# Patient Record
Sex: MALE | URBAN - METROPOLITAN AREA
[De-identification: names, ages, dates, MRNs, and addresses within clinical notes are randomized per-mention and may not be internally consistent; named-entity substitution may affect disease eponyms.]

---

## 2017-09-10 ENCOUNTER — HISTORICAL (OUTPATIENT)
Dept: URGENT CARE | Facility: CLINIC | Age: 22
End: 2017-09-10

## 2017-09-10 LAB — RAPID GROUP A STREP (OHS): POSITIVE

## 2017-09-12 LAB — FINAL CULTURE: NORMAL

## 2018-01-31 LAB
INFLUENZA A ANTIGEN, POC: NEGATIVE
INFLUENZA B ANTIGEN, POC: NEGATIVE
RAPID GROUP A STREP (OHS): POSITIVE

## 2018-02-12 LAB — RAPID GROUP A STREP (OHS): POSITIVE

## 2018-03-12 ENCOUNTER — HISTORICAL (OUTPATIENT)
Dept: URGENT CARE | Facility: CLINIC | Age: 23
End: 2018-03-12

## 2018-03-12 LAB
BILIRUB SERPL-MCNC: NEGATIVE MG/DL
BLOOD URINE, POC: NEGATIVE
CLARITY, POC UA: NORMAL
COLOR, POC UA: NORMAL
GLUCOSE UR QL STRIP: NEGATIVE
KETONES UR QL STRIP: NEGATIVE
LEUKOCYTE EST, POC UA: NEGATIVE
NITRITE, POC UA: NEGATIVE
PH, POC UA: 7
PROTEIN, POC: NEGATIVE
SPECIFIC GRAVITY, POC UA: 1
UROBILINOGEN, POC UA: NORMAL

## 2018-03-14 LAB — FINAL CULTURE: NO GROWTH

## 2019-08-31 LAB
BILIRUB SERPL-MCNC: NEGATIVE MG/DL
BLOOD URINE, POC: NEGATIVE
CLARITY, POC UA: CLEAR
COLOR, POC UA: NORMAL
GLUCOSE UR QL STRIP: NEGATIVE
KETONES UR QL STRIP: NEGATIVE
LEUKOCYTE EST, POC UA: NEGATIVE
NITRITE, POC UA: NEGATIVE
PH, POC UA: 7
PROTEIN, POC: NEGATIVE
SPECIFIC GRAVITY, POC UA: 1.01
UROBILINOGEN, POC UA: NORMAL

## 2019-09-01 ENCOUNTER — HISTORICAL (OUTPATIENT)
Dept: URGENT CARE | Facility: CLINIC | Age: 24
End: 2019-09-01

## 2019-12-24 LAB
INFLUENZA A ANTIGEN, POC: NEGATIVE
INFLUENZA B ANTIGEN, POC: NEGATIVE
RAPID GROUP A STREP (OHS): NEGATIVE

## 2022-04-10 ENCOUNTER — HISTORICAL (OUTPATIENT)
Dept: ADMINISTRATIVE | Facility: HOSPITAL | Age: 27
End: 2022-04-10

## 2022-04-28 VITALS
OXYGEN SATURATION: 99 % | DIASTOLIC BLOOD PRESSURE: 84 MMHG | BODY MASS INDEX: 25.98 KG/M2 | WEIGHT: 191.81 LBS | HEIGHT: 72 IN | SYSTOLIC BLOOD PRESSURE: 143 MMHG

## 2022-09-16 ENCOUNTER — HISTORICAL (OUTPATIENT)
Dept: ADMINISTRATIVE | Facility: HOSPITAL | Age: 27
End: 2022-09-16

## 2022-09-17 ENCOUNTER — HISTORICAL (OUTPATIENT)
Dept: ADMINISTRATIVE | Facility: HOSPITAL | Age: 27
End: 2022-09-17

## 2023-10-28 ENCOUNTER — OFFICE VISIT (OUTPATIENT)
Dept: URGENT CARE | Facility: CLINIC | Age: 28
End: 2023-10-28
Payer: COMMERCIAL

## 2023-10-28 VITALS
HEIGHT: 71 IN | RESPIRATION RATE: 19 BRPM | HEART RATE: 84 BPM | BODY MASS INDEX: 28.84 KG/M2 | WEIGHT: 206 LBS | TEMPERATURE: 98 F | DIASTOLIC BLOOD PRESSURE: 89 MMHG | OXYGEN SATURATION: 100 % | SYSTOLIC BLOOD PRESSURE: 146 MMHG

## 2023-10-28 DIAGNOSIS — R35.0 URINARY FREQUENCY: Primary | ICD-10-CM

## 2023-10-28 LAB
BILIRUB SERPL-MCNC: NORMAL MG/DL
BLOOD URINE, POC: NORMAL
CLARITY, POC UA: CLEAR
COLOR, POC UA: YELLOW
GLUCOSE UR QL STRIP: NORMAL
KETONES UR QL STRIP: NORMAL
LEUKOCYTE ESTERASE URINE, POC: NORMAL
NITRITE, POC UA: NORMAL
PH, POC UA: 6
PROTEIN, POC: NORMAL
SPECIFIC GRAVITY, POC UA: 1.02
UROBILINOGEN, POC UA: NORMAL

## 2023-10-28 PROCEDURE — 81002 POCT URINE DIPSTICK WITHOUT MICROSCOPE: ICD-10-PCS | Mod: ,,, | Performed by: PHYSICIAN ASSISTANT

## 2023-10-28 PROCEDURE — 99204 OFFICE O/P NEW MOD 45 MIN: CPT | Mod: ,,, | Performed by: PHYSICIAN ASSISTANT

## 2023-10-28 PROCEDURE — 99204 PR OFFICE/OUTPT VISIT, NEW, LEVL IV, 45-59 MIN: ICD-10-PCS | Mod: ,,, | Performed by: PHYSICIAN ASSISTANT

## 2023-10-28 PROCEDURE — 81002 URINALYSIS NONAUTO W/O SCOPE: CPT | Mod: ,,, | Performed by: PHYSICIAN ASSISTANT

## 2023-10-28 RX ORDER — SULFAMETHOXAZOLE AND TRIMETHOPRIM 800; 160 MG/1; MG/1
1 TABLET ORAL 2 TIMES DAILY
Qty: 28 TABLET | Refills: 0 | Status: SHIPPED | OUTPATIENT
Start: 2023-10-28 | End: 2023-11-11

## 2023-10-28 NOTE — PROGRESS NOTES
"Subjective:      Patient ID: Joseph West is a 28 y.o. male.    Vitals:  height is 5' 11" (1.803 m) and weight is 93.4 kg (206 lb). His oral temperature is 98.3 °F (36.8 °C). His blood pressure is 146/89 (abnormal) and his pulse is 84. His respiration is 19 and oxygen saturation is 100%.     Chief Complaint: Urinary Frequency (Pain after urination, Abdominal Pain, Back Pain (1 1/2 weeks))    The patient is a 28-year-old male who complains of a 2 to three-week history of urinary urgency/frequency, mild dysuria, and weak urinary stream.  He was seen at another clinic where he reports they obtained a normal blood sugar and a negative UA.  He does not know of any additional testing that was obtained.  He is sexually active.  He denies any penile discharge or penile rash.      ROS   Objective:     Physical Exam   Constitutional: He is oriented to person, place, and time. He appears well-developed.   HENT:   Head: Normocephalic and atraumatic.   Ears:   Right Ear: External ear normal.   Left Ear: External ear normal.   Nose: Nose normal.   Mouth/Throat: Mucous membranes are normal.   Eyes: Conjunctivae and lids are normal.   Neck: Trachea normal. Neck supple.   Cardiovascular: Normal rate, regular rhythm and normal heart sounds.   Pulmonary/Chest: Effort normal and breath sounds normal. No respiratory distress.   Abdominal: Normal appearance and bowel sounds are normal. He exhibits no distension and no mass. Soft. There is no abdominal tenderness. There is no left CVA tenderness and no right CVA tenderness.   Musculoskeletal: Normal range of motion.         General: Normal range of motion.   Neurological: He is alert and oriented to person, place, and time. He has normal strength.   Skin: Skin is warm, dry, intact, not diaphoretic and not pale.   Psychiatric: His speech is normal and behavior is normal. Judgment and thought content normal.   Nursing note and vitals reviewed.    Given the patient's age and being " "sexually active we will send off STD testing.  Due to the fact that he reports the urinary frequency and weak urinary stream I will start a 2 week course of antibiotics to cover for a prostatitis.  I also send a referral to Urology.     Previous History      Review of patient's allergies indicates:  No Known Allergies    History reviewed. No pertinent past medical history.  Current Outpatient Medications   Medication Instructions    sulfamethoxazole-trimethoprim 800-160mg (BACTRIM DS) 800-160 mg Tab 1 tablet, Oral, 2 times daily     History reviewed. No pertinent surgical history.  History reviewed. No pertinent family history.    Social History     Tobacco Use    Smoking status: Never    Smokeless tobacco: Never   Substance Use Topics    Alcohol use: Never    Drug use: Never        Physical Exam      Vital Signs Reviewed   BP (!) 146/89 (BP Location: Right arm, Patient Position: Sitting, BP Method: Large (Automatic))   Pulse 84   Temp 98.3 °F (36.8 °C) (Oral)   Resp 19   Ht 5' 11" (1.803 m)   Wt 93.4 kg (206 lb)   SpO2 100%   BMI 28.73 kg/m²        Procedures    Procedures     Labs     Results for orders placed or performed in visit on 10/28/23   POCT urine dipstick without microscope   Result Value Ref Range    Color, UA Yellow     pH, UA 6     WBC, UA Neg     Nitrite, UA Neg     Protein, POC Neg     Glucose, UA Neg     Ketones, UA Neg     Urobilinogen, UA Norm     Bilirubin, POC Neg     Blood, UA Neg     Clarity, UA Clear     Spec Grav UA 1.020        Assessment:     1. Urinary frequency        Plan:       Urinary frequency  -     POCT urine dipstick without microscope  -     Urine culture  -     C.trach/N.gonor AMP RNA; Future; Expected date: 10/28/2023  -     Trichomonas vaginalis Amplified RNA  -     Ambulatory referral/consult to Urology    Other orders  -     sulfamethoxazole-trimethoprim 800-160mg (BACTRIM DS) 800-160 mg Tab; Take 1 tablet by mouth 2 (two) times daily. for 14 days  Dispense: 28 " tablet; Refill: 0      Complete full course of antibiotics.      Drink plenty of water daily. Avoid soda.      Present to the nearest Emergency Department with any significant change or worsening of symptoms including but not limited to blood in urine, nausea/vomiting, abdominal pain, fever, body aches, chills, or flank pain.

## 2023-10-28 NOTE — PATIENT INSTRUCTIONS
Complete full course of antibiotics.      Drink plenty of water daily. Avoid soda.      Present to the nearest Emergency Department with any significant change or worsening of symptoms including but not limited to blood in urine, nausea/vomiting, abdominal pain, fever, body aches, chills, or flank pain.

## 2023-10-30 LAB — BACTERIA UR CULT: NO GROWTH

## 2023-11-02 LAB
C TRACH RRNA SPEC QL NAA+PROBE: NEGATIVE
N GONORRHOEA RRNA SPEC QL NAA+PROBE: NEGATIVE
SPECIMEN SOURCE: NORMAL
SPECIMEN SOURCE: NORMAL

## 2023-11-14 LAB
SPECIMEN SOURCE: NORMAL
T VAGINALIS RRNA SPEC QL NAA+PROBE: NEGATIVE

## 2024-06-30 ENCOUNTER — OFFICE VISIT (OUTPATIENT)
Dept: URGENT CARE | Facility: CLINIC | Age: 29
End: 2024-06-30
Payer: COMMERCIAL

## 2024-06-30 VITALS
TEMPERATURE: 98 F | WEIGHT: 205 LBS | SYSTOLIC BLOOD PRESSURE: 151 MMHG | HEART RATE: 55 BPM | DIASTOLIC BLOOD PRESSURE: 94 MMHG | HEIGHT: 71 IN | OXYGEN SATURATION: 100 % | RESPIRATION RATE: 18 BRPM | BODY MASS INDEX: 28.7 KG/M2

## 2024-06-30 DIAGNOSIS — L25.9 CONTACT DERMATITIS, UNSPECIFIED CONTACT DERMATITIS TYPE, UNSPECIFIED TRIGGER: Primary | ICD-10-CM

## 2024-06-30 PROCEDURE — 99213 OFFICE O/P EST LOW 20 MIN: CPT | Mod: 25,,, | Performed by: FAMILY MEDICINE

## 2024-06-30 PROCEDURE — 96372 THER/PROPH/DIAG INJ SC/IM: CPT | Mod: ,,, | Performed by: FAMILY MEDICINE

## 2024-06-30 RX ORDER — DEXAMETHASONE SODIUM PHOSPHATE 100 MG/10ML
10 INJECTION INTRAMUSCULAR; INTRAVENOUS
Status: COMPLETED | OUTPATIENT
Start: 2024-06-30 | End: 2024-06-30

## 2024-06-30 RX ORDER — PREDNISONE 20 MG/1
TABLET ORAL
Qty: 15 TABLET | Refills: 0 | Status: SHIPPED | OUTPATIENT
Start: 2024-06-30

## 2024-06-30 RX ADMIN — DEXAMETHASONE SODIUM PHOSPHATE 10 MG: 100 INJECTION INTRAMUSCULAR; INTRAVENOUS at 10:06

## 2024-06-30 NOTE — PATIENT INSTRUCTIONS
Plan:   Possibly poison ivy dermatitis given this began a couple a days after doing yard work.  Medications sent to pharmacy  Start oral steroids tomorrow  You can take Claritin or Zyrtec during the daytime and then Benadryl at bedtime.  Calamine lotion daily as needed  Oatmeal bath soaks as needed  Return to clinic with any concerns

## 2024-06-30 NOTE — PROGRESS NOTES
"Subjective:      Patient ID: Joseph West is a 29 y.o. male.    Vitals:  height is 5' 10.98" (1.803 m) and weight is 93 kg (205 lb). His temperature is 98.1 °F (36.7 °C). His blood pressure is 151/94 (abnormal) and his pulse is 55 (abnormal). His respiration is 18 and oxygen saturation is 100%.     Chief Complaint: Rash     Patient is a 29 y.o. male who presents to urgent care with complaints of  redness, itchy rash(left arm) x4 days. Alleviating factors include cortisone cream with mild amount of relief. Patient denies blisters, discharge, no recent changes in detergent or soap .  States he did both the lawn and do some yard work the knee change new foods or new meds.  Rash is isolated to the left forearm      Rash      Constitution: Negative.   HENT: Negative.     Neck: neck negative.   Cardiovascular: Negative.    Eyes: Negative.    Respiratory: Negative.     Gastrointestinal: Negative.    Genitourinary: Negative.    Musculoskeletal: Negative.    Skin:  Positive for rash.   Allergic/Immunologic: Negative.    Neurological: Negative.    Hematologic/Lymphatic: Negative.       Objective:     Physical Exam   Constitutional: He is oriented to person, place, and time.  Non-toxic appearance. He does not appear ill. No distress.   HENT:   Head: Normocephalic and atraumatic.   Eyes: Conjunctivae are normal.   Pulmonary/Chest: Effort normal.   Abdominal: Normal appearance.   Neurological: He is alert and oriented to person, place, and time.   Skin: Skin is not diaphoretic and rash (there is an erythemic papular rash on the left forearm.).   Psychiatric: His behavior is normal. Mood, judgment and thought content normal.   Vitals reviewed.         Previous History      Review of patient's allergies indicates:  No Known Allergies    Past Medical History:   Diagnosis Date    Known health problems: none      Current Outpatient Medications   Medication Instructions    predniSONE (DELTASONE) 20 MG tablet Take 2 tabs p.o. q.d. " "x5 days then 1 tab p.o. q.d. x5 days     Past Surgical History:   Procedure Laterality Date    TONSILLECTOMY       Family History   Problem Relation Name Age of Onset    No Known Problems Mother      Diabetes Father      No Known Problems Sister      No Known Problems Brother         Social History     Tobacco Use    Smoking status: Never    Smokeless tobacco: Never   Substance Use Topics    Alcohol use: Never    Drug use: Never        Physical Exam      Vital Signs Reviewed   BP (!) 151/94   Pulse (!) 55   Temp 98.1 °F (36.7 °C)   Resp 18   Ht 5' 10.98" (1.803 m)   Wt 93 kg (205 lb)   SpO2 100%   BMI 28.60 kg/m²        Procedures    Procedures     Labs     Results for orders placed or performed in visit on 10/28/23   Urine culture    Specimen: Urine, Clean Catch   Result Value Ref Range    Urine Culture No Growth    C.trach/N.gonor AMP RNA    Specimen: Urine   Result Value Ref Range    Chlamydia trachomatis amplified RNA Negative Negative    Neisseria gonorrhoeae amplified RNA Negative Negative    Source urine     SOURCE: urine    POCT urine dipstick without microscope   Result Value Ref Range    Color, UA Yellow     pH, UA 6     WBC, UA Neg     Nitrite, UA Neg     Protein, POC Neg     Glucose, UA Neg     Ketones, UA Neg     Urobilinogen, UA Norm     Bilirubin, POC Neg     Blood, UA Neg     Clarity, UA Clear     Spec Grav UA 1.020    Trichomonas vaginalis Amplified RNA    Specimen: Vagina   Result Value Ref Range    SOURCE: URINE     Trichomonas vaginalis amplified RNA NEGATIVE        Assessment:     1. Contact dermatitis, unspecified contact dermatitis type, unspecified trigger        Plan:   Possibly poison ivy dermatitis given this began a couple a days after doing yard work.  Medications sent to pharmacy  Start oral steroids tomorrow  You can take Claritin or Zyrtec during the daytime and then Benadryl at bedtime.  Calamine lotion daily as needed  Oatmeal bath soaks as needed  Return to clinic with any " concerns      Contact dermatitis, unspecified contact dermatitis type, unspecified trigger    Other orders  -     dexAMETHasone injection 10 mg  -     predniSONE (DELTASONE) 20 MG tablet; Take 2 tabs p.o. q.d. x5 days then 1 tab p.o. q.d. x5 days  Dispense: 15 tablet; Refill: 0